# Patient Record
Sex: FEMALE | Race: WHITE
[De-identification: names, ages, dates, MRNs, and addresses within clinical notes are randomized per-mention and may not be internally consistent; named-entity substitution may affect disease eponyms.]

---

## 2018-04-18 ENCOUNTER — HOSPITAL ENCOUNTER (EMERGENCY)
Dept: HOSPITAL 88 - ER | Age: 78
Discharge: HOME | End: 2018-04-18
Payer: MEDICARE

## 2018-04-18 VITALS — WEIGHT: 180 LBS | HEIGHT: 65 IN | BODY MASS INDEX: 29.99 KG/M2

## 2018-04-18 VITALS — SYSTOLIC BLOOD PRESSURE: 132 MMHG | DIASTOLIC BLOOD PRESSURE: 55 MMHG

## 2018-04-18 DIAGNOSIS — S81.811A: Primary | ICD-10-CM
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
## 2018-04-18 PROCEDURE — 99283 EMERGENCY DEPT VISIT LOW MDM: CPT

## 2018-04-18 PROCEDURE — 90471 IMMUNIZATION ADMIN: CPT

## 2018-04-18 PROCEDURE — 90714 TD VACC NO PRESV 7 YRS+ IM: CPT

## 2020-07-20 ENCOUNTER — HOSPITAL ENCOUNTER (OUTPATIENT)
Dept: HOSPITAL 88 - FSED | Age: 80
Setting detail: OBSERVATION
LOS: 1 days | Discharge: HOME | End: 2020-07-21
Attending: INTERNAL MEDICINE | Admitting: INTERNAL MEDICINE
Payer: MEDICARE

## 2020-07-20 VITALS — SYSTOLIC BLOOD PRESSURE: 131 MMHG | DIASTOLIC BLOOD PRESSURE: 67 MMHG

## 2020-07-20 VITALS — HEIGHT: 62 IN | WEIGHT: 180.38 LBS | BODY MASS INDEX: 33.19 KG/M2

## 2020-07-20 VITALS — DIASTOLIC BLOOD PRESSURE: 67 MMHG | SYSTOLIC BLOOD PRESSURE: 131 MMHG

## 2020-07-20 DIAGNOSIS — E78.5: ICD-10-CM

## 2020-07-20 DIAGNOSIS — Z11.59: ICD-10-CM

## 2020-07-20 DIAGNOSIS — I49.5: ICD-10-CM

## 2020-07-20 DIAGNOSIS — I16.0: Primary | ICD-10-CM

## 2020-07-20 DIAGNOSIS — Z88.5: ICD-10-CM

## 2020-07-20 DIAGNOSIS — Z88.8: ICD-10-CM

## 2020-07-20 DIAGNOSIS — Z82.49: ICD-10-CM

## 2020-07-20 PROCEDURE — 93005 ELECTROCARDIOGRAM TRACING: CPT

## 2020-07-20 PROCEDURE — 80053 COMPREHEN METABOLIC PANEL: CPT

## 2020-07-20 PROCEDURE — 71045 X-RAY EXAM CHEST 1 VIEW: CPT

## 2020-07-20 PROCEDURE — 82550 ASSAY OF CK (CPK): CPT

## 2020-07-20 PROCEDURE — 99284 EMERGENCY DEPT VISIT MOD MDM: CPT

## 2020-07-20 PROCEDURE — 80048 BASIC METABOLIC PNL TOTAL CA: CPT

## 2020-07-20 PROCEDURE — 85610 PROTHROMBIN TIME: CPT

## 2020-07-20 PROCEDURE — 85025 COMPLETE CBC W/AUTO DIFF WBC: CPT

## 2020-07-20 PROCEDURE — 84484 ASSAY OF TROPONIN QUANT: CPT

## 2020-07-20 PROCEDURE — 83880 ASSAY OF NATRIURETIC PEPTIDE: CPT

## 2020-07-20 PROCEDURE — 93306 TTE W/DOPPLER COMPLETE: CPT

## 2020-07-20 PROCEDURE — 82948 REAGENT STRIP/BLOOD GLUCOSE: CPT

## 2020-07-20 PROCEDURE — 82553 CREATINE MB FRACTION: CPT

## 2020-07-20 PROCEDURE — 36415 COLL VENOUS BLD VENIPUNCTURE: CPT

## 2020-07-20 RX ADMIN — METOPROLOL TARTRATE SCH MG: 25 TABLET, FILM COATED ORAL at 20:04

## 2020-07-20 RX ADMIN — FAMOTIDINE SCH MG: 20 TABLET, FILM COATED ORAL at 20:04

## 2020-07-20 NOTE — NUR
PT ARRIVED BY EMS STRETCHER TO ROOM 102. PT IS AAOX3, RR EVEN AND NON-LABORED, ON ROOM AIR. 
NO S/SX OF DISTRESS NOTED. PT REPORTS FEELING BETTER SINCE ADMISSION. ORIENTED PT TO 
HOSPITAL ROOM, CALL LIGHT, PHONE, BED CONTROLS, LIGHTS AND HOSPITAL POLICY. LEFT PT LAYING 
SEMI FOWLERS IN BED, BED IN LOW LOCKED POSITION, SIDE RAILS UPX2, CALL LIGHT AND PHONE WITH 
IN REACH.

## 2020-07-20 NOTE — DIAGNOSTIC IMAGING REPORT
EXAMINATION:  CXR 1 [facility]    



INDICATION:      Hypertension  

^SOB

^[id number]

^1836 



COMPARISON:  None

     

FINDINGS:

TUBES and LINES:  None.



LUNGS:  Lungs are well inflated.  Lungs are clear.   There is no evidence of

pneumonia or pulmonary edema.



PLEURA:  No pleural effusion or pneumothorax.



HEART AND MEDIASTINUM:  The cardiomediastinal silhouette is unremarkable.    



BONES AND SOFT TISSUES:  No acute osseous lesion.  Soft tissues are

unremarkable.



UPPER ABDOMEN: No free air under the diaphragm.    



IMPRESSION: 

No acute thoracic abnormality.





Signed by: Dr. Pritesh Vazquez M.D. on 7/20/2020 6:39 PM

## 2020-07-20 NOTE — EMERGENCY DEPARTMENT NOTE
History of Present Illnes


History of Present Illness


Chief Complaint:  General Medicine Complaints


History of Present Illness


This is a 80 year old  female hx htn, carotid surgeries her for SOB and

blood pressure was high. SHE HAS BEEN SEEING BY DR GODINEZ PCP & DR REDMOND 

CARDIOLOGIST, BP MEDS CHANGED APPROX 2 WEEKS AGO, CAUSING ISSUES W/ BP. HER BLOO

PRESSURE WAS LOW AND NP @ PCP OFFICE SAID TO HOLD WHEN BP LOW.  PT. SAYS SHE 

DIDN'T TAKE BP MEDS FOR 5 DAYS.  TODAY SHE STOOD UP AND HEAD FELT DIZZY.  SHE 

STATES WENT TO PHARMACY AND HAD BP CHECKED, AND IT /108


Historian:  Patient


Arrival Mode:  Car


 Required:  No


Radiation:  Reports non-radiation


Onset quality:  gradual


Duration (how long):  day(s)


Timing of current episode:  intermittent


Progression:  waxing and waning


Relieving factors:  none


Exacerbating factors:  none


Associated symptoms:  Reports denies other symptoms


Treatments prior to arrival:  none





Past Medical/Family History


Physician Review


I have reviewed the patient's past medical and family history.  Any updates have

been documented here.





Past Medical History


Recent Fever:  No


Clinical Suspicion of Infectio:  No


New/Unexplained Change in Ment:  No


Past Medical History:  Hypertension


Other Medical History:  


CHOLESTEROL


Past Surgical History:  Hysterectomy


Other Surgery:  


L CAROTID





Social History


Smoking Cessation:  Never Smoker


Alcohol Use:  None


Any Illegal Drug Use:  No





Other


Last Tetanus:  UNK


Any Pre-Existing Lines (PICC,:  No





Review of Systems


Review of Systems


Constitutional:  Reports no symptoms


EENTM:  Reports no symptoms


Cardiovascular:  Reports edema


Respiratory:  Reports dyspnea


Gastrointestinal:  Reports no symptoms


Genitourinary:  Reports no symptoms


Musculoskeletal:  Reports no symptoms


Integumentary:  Reports ecchymosis, Reports other (SWELLING)


Neurological:  Reports no symptoms, Reports as per HPI


Psychological:  Reports no symptoms


Endocrine:  Reports no symptoms


Hematological/Lymphatic:  Reports no symptoms





Physical Exam


Related Data


Allergies:  


Coded Allergies:  


     aspirin (Verified  Allergy, Intermediate, 8/25/17)


     codeine (Verified  Allergy, Unknown, 8/25/17)


Triage Vital Signs





Vital Signs








  Date Time  Temp Pulse Resp B/P (MAP) Pulse Ox O2 Delivery O2 Flow Rate FiO2


 


7/20/20 17:45 98.5 68 16 222/64 97 Room Air  








Vital signs reviewed:  Yes (bp is high)





Physical Exam


CONSTITUTIONAL





Constitutional:  Present well-developed, Present well-nourished


HENT


HENT:  Present normocephalic, Present atraumatic, Present oropharynx 

clear/moist, Present nose normal


HENT L/R:  Present left ext ear normal, Present right ext ear normal


EYES





Eyes:  Reports PERRL, Reports conjunctivae normal


NECK


Neck:  Present ROM normal


PULMONARY


Pulmonary:  Present effort normal, Present breath sounds normal


CARDIOVASCULAR





Cardiovascular:  Present regular rhythm, Present heart sounds normal, Present 

capillary refill normal, Present normal rate


GASTROINTESTINAL





Abdominal:  Present soft, Present nontender, Present bowel sounds normal


GENITOURINARY





Genitourinary:  Present exam deferred


SKIN


Skin:  Present warm, Present dry


MUSCULOSKELETAL





Musculoskeletal:  Present ROM normal


NEUROLOGICAL





Neurological:  Present alert, Present oriented x 3, Present no gross motor or 

sensory deficits


PSYCHOLOGICAL


Psychological:  Present mood/affect normal, Present judgement normal





Results


Laboratory


Lab results reviewed:  Yes


Laboratory comments


 slightly high





Imaging


Imaging results reviewed:  Yes


Imaging Comments


chest xray normal





Procedures


12 Lead ECG Interpretation


ECG Interpretation :  


   ECG:  ECG 1


   :  Interpreted by ED physician


   Date:  Jul 20, 2020


   Time:  18:01


   Prior ECG tracings:  reviewed


   Rhythm:  sinus rhythm


   Ectopy:  atrial premature contractions


   Rate:  normal


   QRS axis:  left


   Q waves:  II, III, aVF


   Clinical Impression:  dysrhythmia - atrial


   Additional Comments


old inferior MI





Assessment & Plan


Medical Decision Making


MDM


chf, volume overload, htn urgency.





Reassessment


Reassessment


doing better, /68





Assessment & Plan


Final Impression:  


(1) Congestive heart failure


(2) EKG abnormalities


(3) Hypertensive urgency


Depart Disposition:  ADMITTED


Last Vital Signs











  Date Time  Temp Pulse Resp B/P (MAP) Pulse Ox O2 Delivery O2 Flow Rate FiO2


 


7/20/20 17:45 98.5 68 16 222/64 97 Room Air  








Home Meds


Reported Medications


Amlodipine Besylate/Benazepril (LOTREL 10-20 MG CAPSULE) 1 Each Capsule, 1 TAB 

PO DAILY


   1/3/14


Clopidogrel Bisulfate* (PLAVIX) 75 Mg Tablet, 75 MG PO DAILY


   1/3/14


Atorvastatin Calcium (LIPITOR) 20 Mg Tablet, 20 MG PO QHS


   1/3/14


Medications in the ED





Nitroglycerin 1 gm ONCE  ONCE TOP ;  Start 7/20/20 at 18:00;  Stop 7/20/20 at 

18:01


Furosemide 40 mg ONCE  ONCE IV ;  Start 7/20/20 at 18:00;  Stop 7/20/20 at 

18:01;  Status UNV





Physician Attestation


Provider Attestation


case discussed with Dr Godinez, YUDY Lara MD                Jul 20, 2020 18:21

## 2020-07-21 VITALS — DIASTOLIC BLOOD PRESSURE: 56 MMHG | SYSTOLIC BLOOD PRESSURE: 112 MMHG

## 2020-07-21 VITALS — DIASTOLIC BLOOD PRESSURE: 54 MMHG | SYSTOLIC BLOOD PRESSURE: 119 MMHG

## 2020-07-21 VITALS — DIASTOLIC BLOOD PRESSURE: 53 MMHG | SYSTOLIC BLOOD PRESSURE: 101 MMHG

## 2020-07-21 VITALS — SYSTOLIC BLOOD PRESSURE: 123 MMHG | DIASTOLIC BLOOD PRESSURE: 63 MMHG

## 2020-07-21 VITALS — SYSTOLIC BLOOD PRESSURE: 128 MMHG | DIASTOLIC BLOOD PRESSURE: 53 MMHG

## 2020-07-21 LAB
ANION GAP SERPL CALC-SCNC: 12.7 MMOL/L (ref 8–16)
BUN SERPL-MCNC: 14 MG/DL (ref 7–26)
BUN/CREAT SERPL: 15 (ref 6–25)
CALCIUM SERPL-MCNC: 8.5 MG/DL (ref 8.4–10.2)
CHLORIDE SERPL-SCNC: 107 MMOL/L (ref 98–107)
CK MB SERPL-MCNC: 1.9 NG/ML (ref 0–5)
CK MB SERPL-MCNC: 2.1 NG/ML (ref 0–5)
CK SERPL-CCNC: 47 IU/L (ref 29–168)
CK SERPL-CCNC: 48 IU/L (ref 29–168)
CO2 SERPL-SCNC: 27 MMOL/L (ref 22–29)
EGFRCR SERPLBLD CKD-EPI 2021: 59 ML/MIN (ref 60–?)
GLUCOSE SERPLBLD-MCNC: 100 MG/DL (ref 74–118)
POTASSIUM SERPL-SCNC: 3.7 MMOL/L (ref 3.5–5.1)
SODIUM SERPL-SCNC: 143 MMOL/L (ref 136–145)

## 2020-07-21 RX ADMIN — FAMOTIDINE SCH MG: 20 TABLET, FILM COATED ORAL at 08:37

## 2020-07-21 RX ADMIN — FUROSEMIDE SCH MG: 10 INJECTION, SOLUTION INTRAMUSCULAR; INTRAVENOUS at 16:35

## 2020-07-21 RX ADMIN — METOPROLOL TARTRATE SCH MG: 25 TABLET, FILM COATED ORAL at 08:37

## 2020-07-21 RX ADMIN — FUROSEMIDE SCH MG: 10 INJECTION, SOLUTION INTRAMUSCULAR; INTRAVENOUS at 08:37

## 2020-07-21 NOTE — NUR
PATIENT DISCHARGED FROM FACILITY. PATIENT GATHERED ALL PERSONAL BELONGINGS, DISCHARGE 
INSTRUCTIONS, AND FOLLOW UP INFORMATION. LEFT UNIT IN WHEELCHAIR AND WENT HOME VIA PRIVATE 
AUTO.

## 2020-08-18 ENCOUNTER — HOSPITAL ENCOUNTER (EMERGENCY)
Dept: HOSPITAL 88 - FSED | Age: 80
Discharge: HOME | End: 2020-08-18
Payer: COMMERCIAL

## 2020-08-18 VITALS — HEIGHT: 62 IN | BODY MASS INDEX: 30.36 KG/M2 | WEIGHT: 165 LBS

## 2020-08-18 DIAGNOSIS — I73.9: ICD-10-CM
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
## 2020-08-18 DIAGNOSIS — I10: ICD-10-CM

## 2020-08-18 DIAGNOSIS — Z79.01: ICD-10-CM
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
## 2020-08-18 DIAGNOSIS — E78.5: ICD-10-CM

## 2020-08-18 DIAGNOSIS — S81.812A: Primary | ICD-10-CM

## 2020-08-18 PROCEDURE — 99283 EMERGENCY DEPT VISIT LOW MDM: CPT

## 2020-08-18 NOTE — EMERGENCY DEPARTMENT NOTE
History of Present Illnes


History of Present Illness


Chief Complaint:  Extremity Trauma/Pain


History of Present Illness


This is a 80 year old  female, with a history of hypertension, 

hyperlipidemia, and PAD who presents for evaluation of a large skin tear on the 

left lower extremity. Patient states that she was walking outside, tending to 

her pool, when she thinks that she stepped on a water hose that was on the 

ground, stumbled, and her left lower extremity brushed up against the corner of 

a brick planter. Patient denies falling, or any other injury. She states that 

she took Lasix earlier today, due to leg swelling, and she states that "she 

feels like the Lasix makes her weak in the knees," and that that may have 

contributed to her fall. She denies any dizziness, palpitations, chest pain, 

shortness of breath, or syncopal symptoms. There was copious amount of bleeding,

as patient does take Plavix due to a history of carotid angioplasty. They were 

able to stop the blood applying pressure to the area.


Historian:  Patient


Arrival Mode:  Car


 Required:  No


Onset (how long ago):  minute(s) (30)


Location:  left lower calf


Quality:  "painful"


Radiation:  Reports non-radiation


Severity:  moderate


Onset quality:  sudden


Duration (how long):  hour(s) (30 min)


Timing of current episode:  constant


Progression:  unchanged


Chronicity:  new


Context:  Reports trauma/injury; 


   Denies recent illness, Denies recent surgery


Relieving factors:  none


Exacerbating factors:  none


Associated symptoms:  Reports denies other symptoms; 


   Denies chest pain, Denies fever/chills, Denies nausea/vomiting, Denies 

shortness of breath, Denies syncope, Denies weakness


Treatments prior to arrival:  none





Past Medical/Family History


Physician Review


I have reviewed the patient's past medical and family history.  Any updates have

been documented here.





Past Medical History


Recent Fever:  No


Clinical Suspicion of Infectio:  No


New/Unexplained Change in Ment:  No


Past Medical History:  Hypertension


Other Medical History:  


HIGH CHOLESTEROL


Past Surgical History:  Hysterectomy, Cataract Removal


Other Surgery:  


(L) CAROTID BALLON





Social History


Smoking Cessation:  Never Smoker


Counseling Performed:  No


Alcohol Use:  None


Any Illegal Drug Use:  No


TB Exposure/Symptoms:  No


Physically hurt or threatened:  No





Family History


Family history of heart diseas:  No





Other


Last Tetanus:  UNK


Any Pre-Existing Lines (PICC,:  No





Review of Systems


Review of Systems


Constitutional:  Denies chills, Denies fever, Denies malaise, Denies weakness


EENTM:  Reports no symptoms


Cardiovascular:  Denies chest pain, Denies palpitations


Respiratory:  Reports no symptoms


Gastrointestinal:  Denies nausea, Denies vomiting


Musculoskeletal:  Reports no symptoms


Integumentary:  Reports as per HPI


Neurological:  Denies numbness, Denies paresthesia, Denies tingling, Denies 

weakness


Psychological:  Reports no symptoms


Hematological/Lymphatic:  Reports easy bleeding (on Plavix), Reports easy 

bruising


Review of other systems:  All other systems negative





Physical Exam


Related Data


Allergies:  


Coded Allergies:  


     aspirin (Verified  Allergy, Intermediate, 8/25/17)


     codeine (Verified  Allergy, Unknown, 8/25/17)


Triage Vital Signs





Vital Signs








  Date Time  Temp Pulse Resp B/P (MAP) Pulse Ox O2 Delivery O2 Flow Rate FiO2


 


8/18/20 13:33 97.8 86 18 140/66 99 Room Air  








Vital signs reviewed:  Yes





Physical Exam


CONSTITUTIONAL





Constitutional:  Present well-developed, Present well-nourished


HENT


HENT:  Present normocephalic, Present atraumatic, Present oropharynx 

clear/moist, Present nose normal


HENT L/R:  Present left ext ear normal, Present right ext ear normal


EYES





Eyes:  Reports PERRL, Reports conjunctivae normal


NECK


Neck:  Present ROM normal


PULMONARY


Pulmonary:  Present effort normal, Present breath sounds normal


CARDIOVASCULAR





Cardiovascular:  Present regular rhythm, Present heart sounds normal, Present 

capillary refill normal, Present normal rate


GASTROINTESTINAL





GENITOURINARY





SKIN


Skin:  Present warm, Present bruising, Present other (large area of skin 

avulsion, 11.5 cm x 6.5 cm on the left, anterior, lateral calf, with partial 

skin flap present and pulled back to the left side of the wound; wound is 

superficial;); 


   Absent rash


MUSCULOSKELETAL





Musculoskeletal:  Present ROM normal, Present edema (trace pedal edema)


NEUROLOGICAL





Neurological:  Present alert, Present oriented x 3, Present no gross motor or 

sensory deficits; 


   Absent cranial nerve deficit, Absent abnormal coordination, Absent abnormal 

gait, Absent weakness


PSYCHOLOGICAL


Psychological:  Present mood/affect normal, Present judgement normal





Assessment & Plan


Medical Decision Making


MDM


 - For Dr. Vyas tomorrow, to recheck the wound on her left lower extremity, 

discuss continued use of Lasix, and for a referral to wound care.





 - If you're unable to get into Dr. Vyas tomorrow, gently clean the wound of 

the left lower extremity with antibacterial soap and water, dried thoroughly, 

and then apply over-the-counter antibiotic ointment such as bacitracin or triple

antibiotic ointment, followed by a nonstick pad, and secure with gauze. This 

should be repeated at least once daily until seen by Dr. Vyas.





 - Watch closely for any signs of infection including redness around the wound, 

purulent drainage, increased tenderness, increased pain or foul odor.





 - He may take extra strength Tylenol 500 mg2 tabs every 4 hours as needed for 

pain.





Reassessment


Reassessment


 - Wound cleaned thoroughly using a Chlorhexadene sponge, and attempts were made

to approximate skin flap, but there is a significant amount of skin missing and 

full approximation is not possible. There was a small amount of oozing, but no 

active bleeding. Bacitracin ointment was placed on wound, covered with Adaptic, 

then gauze and then secured with Coban. Expert wound care performed by Elicia Oconnor, PEPE.





Assessment & Plan


Final Impression:  


(1) Avulsion of skin of right lower leg


(2) Hypertension


(3) Hyperlipidemia


(4) PAD (peripheral artery disease)


Depart Disposition:  HOME, SELF-CARE


Last Vital Signs











  Date Time  Temp Pulse Resp B/P (MAP) Pulse Ox O2 Delivery O2 Flow Rate FiO2


 


8/18/20 13:33 97.8 86 18 140/66 99 Room Air  








Home Meds


Reported Medications


Furosemide (LASIX) 40 Mg Tablet, 40 MG PO DAILY, #30 TAB


   7/21/20


Metoprolol Succinate (METOPROLOL SUCCINATE) 25 Mg Tab.er.24h, 25 MG PO DAILY


   7/21/20


Lisinopril (LISINOPRIL) 10 Mg Tablet, 10 MG PO DAILY, #30 TAB


   7/21/20


Clopidogrel Bisulfate* (PLAVIX) 75 Mg Tablet, 75 MG PO HS


   1/3/14


Atorvastatin Calcium (LIPITOR) 20 Mg Tablet, 20 MG PO HS


   1/3/14


Medications in the ED





Neomycin/ Polymyxin/ Bacitracin 3.6 gm STK-MED ONCE .ROUTE ;  Start 8/18/20 at 

14:29;  Stop 8/18/20 at 14:24;  Status DC











AARNO JERRY MD              Aug 18, 2020 14:26

## 2020-08-18 NOTE — XMS REPORT
Continuity of Care Document

                             Created on: 2020



COREY KEEN

External Reference #: [id number]

: 1940

Sex: Female



Demographics





                          [place]                   [address] [place]  [address]

 

                          Home Phone                [phone]

 

                          Preferred Language        Unknown

 

                          Marital Status            Unknown

 

                          Yarsani Affiliation     Unknown

 

                          Race                      Unknown

 

                          Additional Race(s)        White



 

                          Ethnic Group              Unknown





Author





                          Author                    [facility]

t

 

                          Organization              [facility]

 

                          Address                   [address]

[place]  [address]



 

                          Phone                     Unavailable







Support





                Name            Relationship    Address         Phone

 

                    ROSALIO BORGES     ECON                [address]

[place]  [address]                     Unavailable







Care Team Providers





                    Care Team Member Name Role                Phone

 

                    GRETCHEN BOYD, MD DANG PCP                 [phone]

 

                    LIEN KUHN             Unavailable







Payers





           Payer Name Policy Type Policy Number Effective Date Expiration Date [facility]            [id number] 2020 00:00:00        

    [facility]







Problems





           Condition Name Condition Details Condition Category Status     Onset 

Date Resolution

Date            Last Treatment Date Treating Clinician Comments        Source

 

       Congestive heart failure        Problem Active                           

         [facility]

 

       Hypertensive urgency        Problem Active                               

     [facility]

 

       Abnormal electrocardiography        Problem Active                       

             [facility]







Allergies, Adverse Reactions, Alerts





        Allergy Name Allergy Type Status  Severity Reaction(s) Onset Date Inacti

ve Date 

Treating Clinician        Comments                  Source

 

       Codeine Allergy to substance Active               2017 00:00:00    

                  [facility]

 

       Aspirin Allergy to substance Active Moderate        2017 00:00:00  

                    [facility]







Social History





           Social Habit Start Date Stop Date  Quantity   Comments   Source

 

           Sex Assigned At Birth 1940 00:00:00 1940 00:00:00 Female 

               [facility]







Medications





             Ordered Medication Name Filled Medication Name Start Date   Stop Da

te    Current 

Medication? Ordering Clinician Indication Dosage     Frequency  Signature (SIG) 

Comments                  Components                Source

 

                          Atorvastatin Calcium (Lipitor) 20 Mg TABLET Atorvastat

in Calcium (Lipitor) 20 Mg

TABLET             Yes               20          Bedtime             [facility]

 

                          Clopidogrel Bisulfate (Plavix) 75 Mg TABLET Clopidogre

l Bisulfate (Plavix) 75 Mg

TABLET             Yes               75          Bedtime             [facility]

 

        Furosemide (Lasix) 40 Mg TABLET Furosemide (Lasix) 40 Mg TABLET         

        Yes                     40       

                Daily                                           [facility]

 

     Lisinopril Lisinopril           Yes            10        Daily           [facility]

 

     Metoprolol Succinate Metoprolol Succinate           Yes            25      

  Daily           [facility]

 

                          Hydrochlorothiazide (Esidrix*) 25 Mg TAB Hydrochloroth

iazide (Esidrix*) 25 Mg 

TAB         2020 00:00:00 No                25          Daily             

[facility]

 

     Valsartan Valsartan      2020 00:00:00 No             160       Daily

           [facility]

 

                          Amlodipine Besylate/Benazepril (Lotrel 10-20 Mg Capsul

e) 1 Each CAPSULE 

Amlodipine Besylate/Benazepril (Lotrel 10-20 Mg Capsule) 1 Each CAPSULE         

                  

2020 00:00:00 No                      1               Daily               

    [facility]







Vital Signs





             Vital Name   Observation Time Observation Value Comments     Source

 

             Body Temperature 2020 16:42:00 97.7 [degF]               [facility]

 

             Weight       2020 21:50:00 180.38 [lb_av]              [facility]

 

             BMI (Body Mass Index) 2020 21:50:00 33.0 kg/m2               

 [facility]







Procedures

This patient has no known procedures.



Plan of Care





             Planned Activity Planned Date Details      Comments     Source

 

             Instructions              Hypertension              [facility]







Encounters





             Start Date/Time End Date/Time Encounter Type Admission Type Attendi

[facility]   Care Department Encounter ID    Source

 

             2020 19:07:00 2020 17:47:00 Discharged Inpatient (obs) 

1            YUDY KUHN

                [facility] [id number]    [facility]

 

          2018 12:25:00 2018 12:25:00 Registered Emergency Room     

                [facility]                    [id number]              [facility]

 

          2017 20:24:00 2017 21:31:00 Departed Emergency Room       

              [facility]                    [id number]              [facility]







Results





           Test Description Test Time  Test Comments Results    Result Comments 

Source

 

                          Capillary blood glucose measurement by glucometer (mas

s/volume) 2020 

15:26:00                                  

 

                                        Test Item

 

             Bedside Glucose (test code = 61706-2) 87                     

           





Meter ID: [id number] 
creatine kinase measurement (enzymatic activity/volume)2020 10:58:00* 



             Test Item    Value        Reference Range Interpretation Comments

 

             Creatine Kinase (test code = 2157-6) 48                      

          





[facility] creatine kinase MB 
measurement (mass/volume)2020 10:58:00* 



             Test Item    Value        Reference Range Interpretation Comments

 

             Creatine Kinase MB (test code = 88778-1) 1.90         0-5.0        

              





[facility] by highly 
sensitive enzyme [id number] 10:58:00* 



             Test Item    Value        Reference Range Interpretation Comments

 

             Troponin I (test code = 78215-8) 0.027        0-0.300              

      





[facility] sodium measurement 
(moles/volume)2020 03:10:00* 



             Test Item    Value        Reference Range Interpretation Comments

 

             Sodium Level (test code = 2951-2) 143          136-145             

       





[facility] potassium measurement 
(moles/volume)2020 03:10:00* 



             Test Item    Value        Reference Range Interpretation Comments

 

             Potassium Level (test code = 2823-3) 3.7          3.5-5.1          

          





[facility] chloride measurement 
(moles/volume)2020 03:10:00* 



             Test Item    Value        Reference Range Interpretation Comments

 

             Chloride Level (test code = 2075-0) 107                      

         





[facility] carbon dioxide, total 
measurement (moles/volume)2020 03:10:00* 



             Test Item    Value        Reference Range Interpretation Comments

 

             Carbon Dioxide Level (test code = 2028-9) 27           22-29       

               





[facility] anion [id number] 
03:10:00* 



             Test Item    Value        Reference Range Interpretation Comments

 

             Anion Gap (test code = 33037-3) 12.7         8-16                  

     





[facility] urea nitrogen measurement
 (mass/volume)2020 03:10:00* 



             Test Item    Value        Reference Range Interpretation Comments

 

             Blood Urea Nitrogen (test code = 3094-0) 14           7-26         

              





[facility] creatinine measurement 
(mass/volume)2020 03:10:00* 



             Test Item    Value        Reference Range Interpretation Comments

 

             Creatinine (test code = 2160-0) 0.92         0.57-1.11             

     





[facility] urea nitrogen/creatinine 
mass [id number] 03:10:00* 



             Test Item    Value        Reference Range Interpretation Comments

 

             BUN/Creatinine Ratio (test code = 3097-3) 15           6-25        

               





[facility] filtration rate 
(GFR) bnywrrzdjrqbj8014-08-55 03:10:00* 



             Test Item    Value        Reference Range Interpretation Comments

 

             Estimat Glomerular Filtration Rate (test code = 776012197) 59      

     >60                        





Ranges were taken from the National Kidney Disease Education Program and the [employer] literature.Reference ranges:60 or greater: Ranrtw86-37 (
for 3 consecutive months): Chronic kidney disease 15 or less: Kidney failureSt. David's [facility] Medical CenterGlucose tjlfqdhgysy0038-22-77 03:10:00* 



             Test Item    Value        Reference Range Interpretation Comments

 

             Glucose Level (test code = GZS7567) 100                      

         





[facility] calcium measurement 
(mass/volume)2020 03:10:00* 



             Test Item    Value        Reference Range Interpretation Comments

 

             Calcium Level (test code = 55766-6) 8.5          8.4-10.2          

         





[facility] - [id number] 18:37:00      
                                                                                
[facility]                        [address] [address]      Patient Name: COREY KEEN        
                        MR #: [id number]                  : 1940       
                            Age/Sex: 80/F  Acct #: [id number]                 
             Req #: [id number]  Adm Physician:                                  
                    Ordered by: YUDY KUHN MD                         Report 
#: [id number]        Location: [facility]                                    Room/Bed: 
                  _________________________________________________________
__________________________________________    Procedure: [id number] HOPD/CXR 1 VE
W - HOPD  Exam Date: 20                            Exam Time:         
                                      REPORT STATUS: Signed    EXAMINATION:  CXR
 1 VEW - HOPD          INDICATION:      Hypertension      SOB    [id number]    183
6       COMPARISON:  None           FINDINGS:   TUBES and LINES:  None.      ABELINO
GS:  Lungs are well inflated.  Lungs are clear.   There is no evidence of   pneu
monia or pulmonary edema.      PLEURA:  No pleural effusion or pneumothorax.    
  HEART AND MEDIASTINUM:  The cardiomediastinal silhouette is unremarkable.     
     BONES AND SOFT TISSUES:  No acute osseous lesion.  Soft tissues are   unrem
arkable.      UPPER ABDOMEN: No free air under the diaphragm.          IMPRESSIO
N:    No acute thoracic abnormality.         Signed by: Dr. Pritesh Vazquez M.D. on
 2020 6:39 PM        Dictated By: PRITESH VAZQUEZ MD, MD  Electronically Angy
d By: PRITESH VAZQUEZ MD, MD on 20  Transcribed By: MORENA on 20       COPY TO:   YUDY KUHN MD

## 2024-08-06 ENCOUNTER — HOSPITAL ENCOUNTER (EMERGENCY)
Dept: HOSPITAL 88 - FSED | Age: 84
Discharge: HOME | End: 2024-08-06
Payer: MEDICARE

## 2024-08-06 VITALS — HEART RATE: 88 BPM | TEMPERATURE: 97.8 F | OXYGEN SATURATION: 100 %

## 2024-08-06 VITALS — HEIGHT: 62 IN | WEIGHT: 165 LBS | BODY MASS INDEX: 30.36 KG/M2

## 2024-08-06 DIAGNOSIS — I10: ICD-10-CM

## 2024-08-06 DIAGNOSIS — L03.113: Primary | ICD-10-CM

## 2024-08-06 DIAGNOSIS — E78.00: ICD-10-CM

## 2024-08-06 PROCEDURE — 99282 EMERGENCY DEPT VISIT SF MDM: CPT

## 2024-08-06 RX ADMIN — Medication ONE MG: at 22:45
